# Patient Record
Sex: FEMALE | Race: ASIAN | NOT HISPANIC OR LATINO
[De-identification: names, ages, dates, MRNs, and addresses within clinical notes are randomized per-mention and may not be internally consistent; named-entity substitution may affect disease eponyms.]

---

## 2018-08-17 PROBLEM — Z00.00 ENCOUNTER FOR PREVENTIVE HEALTH EXAMINATION: Status: ACTIVE | Noted: 2018-08-17

## 2021-04-22 ENCOUNTER — APPOINTMENT (OUTPATIENT)
Dept: OTOLARYNGOLOGY | Facility: CLINIC | Age: 31
End: 2021-04-22

## 2022-05-12 ENCOUNTER — FORM ENCOUNTER (OUTPATIENT)
Age: 32
End: 2022-05-12

## 2022-05-12 ENCOUNTER — APPOINTMENT (OUTPATIENT)
Dept: ORTHOPEDIC SURGERY | Facility: CLINIC | Age: 32
End: 2022-05-12
Payer: COMMERCIAL

## 2022-05-12 VITALS — BODY MASS INDEX: 22.33 KG/M2 | WEIGHT: 134 LBS | HEIGHT: 65 IN

## 2022-05-12 PROCEDURE — 72110 X-RAY EXAM L-2 SPINE 4/>VWS: CPT

## 2022-05-12 PROCEDURE — 99203 OFFICE O/P NEW LOW 30 MIN: CPT

## 2022-05-12 PROCEDURE — 99204 OFFICE O/P NEW MOD 45 MIN: CPT

## 2022-05-12 NOTE — ASSESSMENT
[FreeTextEntry1] : Has been through formal PT guided by PCP, now with return of symptoms. \par Initiate enid\par Gabapentin- Patient advised of sedating effects, instructed not to drive, operate machinery, or take with other sedating medications. Advised of need to taper on/off medication and risk of abruptly stopping gabapentin.\par Indicated for MRI to eval for HNP\par C/w PT

## 2022-05-12 NOTE — IMAGING
[de-identified] : Inspection: No rash or ecchymosis\par Palpation: No tenderness to palpation or spasm in bilateral thoracic and lumbar paraspinal musculature, RIGHT SI joint tenderness to palpation\par ROM: Full with stiffness\par Strength: 5/5 bilateral hip flexors, knee extensors, ankle dorsiflexors, EHL, ankle plantarflexors\par Sensation: Sensation present to light touch bilateral L2-S1 distributions\par Provocative maneuvers: Negative left straight leg raise, + R SLR  [No bony abnormalities] : No bony abnormalities [AP] : anteroposterior [There are no fractures, subluxations or dislocations. No significant abnormalities are seen] : There are no fractures, subluxations or dislocations. No significant abnormalities are seen

## 2022-05-13 ENCOUNTER — APPOINTMENT (OUTPATIENT)
Dept: MRI IMAGING | Facility: CLINIC | Age: 32
End: 2022-05-13
Payer: COMMERCIAL

## 2022-05-13 PROCEDURE — 72148 MRI LUMBAR SPINE W/O DYE: CPT

## 2022-06-02 ENCOUNTER — APPOINTMENT (OUTPATIENT)
Dept: ORTHOPEDIC SURGERY | Facility: CLINIC | Age: 32
End: 2022-06-02
Payer: COMMERCIAL

## 2022-06-02 VITALS — BODY MASS INDEX: 22.33 KG/M2 | HEIGHT: 65 IN | WEIGHT: 134 LBS

## 2022-06-02 PROCEDURE — 99214 OFFICE O/P EST MOD 30 MIN: CPT

## 2022-06-02 NOTE — IMAGING
[No bony abnormalities] : No bony abnormalities [AP] : anteroposterior [There are no fractures, subluxations or dislocations. No significant abnormalities are seen] : There are no fractures, subluxations or dislocations. No significant abnormalities are seen [de-identified] : LSPINE\par Palpation: No tenderness to palpation or spasm in bilateral thoracic and lumbar paraspinal musculature, RIGHT SI joint tenderness to palpation\par ROM: Full with stiffness\par Strength: 5/5 bilateral hip flexors, knee extensors, ankle dorsiflexors, EHL, ankle plantarflexors\par Sensation: Sensation present to light touch bilateral L2-S1 distributions\par Provocative maneuvers: Negative left straight leg raise, + R SLR

## 2022-06-02 NOTE — HISTORY OF PRESENT ILLNESS
[Lower back] : lower back [Work] : work [Sleep] : sleep [Sitting] : sitting [0] : 0 [de-identified] : L spine MRI 5/13/22\par Sagittal alignment: Normal.\par Coronal alignment: Normal.\par L1-2, L2-3 and L3-4: Normal.\par L4-5: Mild degenerative disc disease with a small bulge but no stenosis or nerve root compression.\par L5-S1: Mild degenerative disc disease with a central and right-sided disc herniation with mild compression of \par the right S1 nerve root.\par Ind. review- L5/S1 annular injury with R paracentral and lateral recess narrowing\par __________________\par \par 05/12/2022 : a 33 yo female, presents for right lower back pain noticed on 05/02/2022 after waking up. a constant pain that radiate to the right gluteal area with stiffness and swelling of the right lower back, tingling in to the right leg. she described the pain as tightness/soreness. no specific injury/pain medication/x-ray. Reports a 'nerve test' in the legs Sept 2021 showed pinched nerve performed at PT. Was in formal PT guided by PCP at that time and symptoms resolved, has been doing HEP since. \par PMH- denies PSH- septoplasty, appy \par 6/2/22- MRI follow-up. Sxs somewhat improved, still radiating to the R glutes intermittently to the leg\par \par  [] : no [FreeTextEntry5] : 06/02/2022: a 31 yo female, presents for lower back pain follow up. still experiencing occasional tingling of the right foreleg. no pain/stiffness/numbness. +PT [de-identified] : 05/31/2022 [de-identified] : MRI

## 2022-06-02 NOTE — ASSESSMENT
[FreeTextEntry1] : L5/S1 annular injury with R paracentral and lateral recess narrowing\par \par Gabapentin- Patient advised of sedating effects, instructed not to drive, operate machinery, or take with other sedating medications. Advised of need to taper on/off medication and risk of abruptly stopping gabapentin.\par \par Discussed Pain\par C/w PT

## 2022-08-04 ENCOUNTER — APPOINTMENT (OUTPATIENT)
Dept: ORTHOPEDIC SURGERY | Facility: CLINIC | Age: 32
End: 2022-08-04

## 2022-10-21 ENCOUNTER — APPOINTMENT (OUTPATIENT)
Dept: FAMILY MEDICINE | Facility: CLINIC | Age: 32
End: 2022-10-21

## 2022-10-21 VITALS
OXYGEN SATURATION: 99 % | HEIGHT: 65 IN | HEART RATE: 89 BPM | DIASTOLIC BLOOD PRESSURE: 76 MMHG | WEIGHT: 138 LBS | TEMPERATURE: 97.7 F | SYSTOLIC BLOOD PRESSURE: 113 MMHG | BODY MASS INDEX: 22.99 KG/M2

## 2022-10-21 DIAGNOSIS — Z87.09 PERSONAL HISTORY OF OTHER DISEASES OF THE RESPIRATORY SYSTEM: ICD-10-CM

## 2022-10-21 DIAGNOSIS — Z83.3 FAMILY HISTORY OF DIABETES MELLITUS: ICD-10-CM

## 2022-10-21 DIAGNOSIS — Z87.891 PERSONAL HISTORY OF NICOTINE DEPENDENCE: ICD-10-CM

## 2022-10-21 DIAGNOSIS — T78.02XA ANAPHYLACTIC REACTION DUE TO SHELLFISH (CRUSTACEANS), INITIAL ENCOUNTER: ICD-10-CM

## 2022-10-21 DIAGNOSIS — Z20.2 CONTACT WITH AND (SUSPECTED) EXPOSURE TO INFECTIONS WITH A PREDOMINANTLY SEXUAL MODE OF TRANSMISSION: ICD-10-CM

## 2022-10-21 DIAGNOSIS — G93.32 MYALGIC ENCEPHALOMYELITIS/CHRONIC FATIGUE SYNDROME: ICD-10-CM

## 2022-10-21 DIAGNOSIS — Z82.49 FAMILY HISTORY OF ISCHEMIC HEART DISEASE AND OTHER DISEASES OF THE CIRCULATORY SYSTEM: ICD-10-CM

## 2022-10-21 DIAGNOSIS — Z86.69 PERSONAL HISTORY OF OTHER DISEASES OF THE NERVOUS SYSTEM AND SENSE ORGANS: ICD-10-CM

## 2022-10-21 DIAGNOSIS — Z87.19 PERSONAL HISTORY OF OTHER DISEASES OF THE DIGESTIVE SYSTEM: ICD-10-CM

## 2022-10-21 PROCEDURE — 36415 COLL VENOUS BLD VENIPUNCTURE: CPT

## 2022-10-21 PROCEDURE — 99385 PREV VISIT NEW AGE 18-39: CPT | Mod: 25

## 2022-10-21 NOTE — HISTORY OF PRESENT ILLNESS
[de-identified] : Pt came to office for annual physical exam today.\par Pt complained feeling very tired lately. Pt has sleep apnea and used CPAP machine every night and cleaned the machine regularly.

## 2022-10-22 ENCOUNTER — TRANSCRIPTION ENCOUNTER (OUTPATIENT)
Age: 32
End: 2022-10-22

## 2022-10-22 ENCOUNTER — APPOINTMENT (OUTPATIENT)
Dept: FAMILY MEDICINE | Facility: CLINIC | Age: 32
End: 2022-10-22

## 2022-10-22 PROCEDURE — 36415 COLL VENOUS BLD VENIPUNCTURE: CPT

## 2022-10-23 LAB
ALBUMIN SERPL ELPH-MCNC: 3.9 G/DL
ALP BLD-CCNC: 68 U/L
ALT SERPL-CCNC: 14 U/L
ANION GAP SERPL CALC-SCNC: 13 MMOL/L
APPEARANCE: CLEAR
AST SERPL-CCNC: 14 U/L
BASOPHILS # BLD AUTO: 0.05 K/UL
BASOPHILS NFR BLD AUTO: 0.4 %
BILIRUB SERPL-MCNC: 0.2 MG/DL
BILIRUBIN URINE: NEGATIVE
BLOOD URINE: NEGATIVE
BUN SERPL-MCNC: 9 MG/DL
CALCIUM SERPL-MCNC: 9.1 MG/DL
CHLORIDE SERPL-SCNC: 104 MMOL/L
CHOLEST SERPL-MCNC: 135 MG/DL
CO2 SERPL-SCNC: 22 MMOL/L
COLOR: NORMAL
CREAT SERPL-MCNC: 0.74 MG/DL
EGFR: 109 ML/MIN/1.73M2
EOSINOPHIL # BLD AUTO: 0.37 K/UL
EOSINOPHIL NFR BLD AUTO: 3.3 %
ESTIMATED AVERAGE GLUCOSE: 108 MG/DL
GLUCOSE QUALITATIVE U: NEGATIVE
GLUCOSE SERPL-MCNC: 84 MG/DL
HBA1C MFR BLD HPLC: 5.4 %
HBV CORE IGG+IGM SER QL: NONREACTIVE
HBV SURFACE AB SER QL: REACTIVE
HBV SURFACE AG SER QL: NONREACTIVE
HCT VFR BLD CALC: 43.8 %
HCV AB SER QL: NONREACTIVE
HCV S/CO RATIO: 0.14 S/CO
HDLC SERPL-MCNC: 56 MG/DL
HGB BLD-MCNC: 14.1 G/DL
HIV1+2 AB SPEC QL IA.RAPID: NONREACTIVE
HSV 1+2 IGG SER IA-IMP: NEGATIVE
HSV 1+2 IGG SER IA-IMP: POSITIVE
HSV1 IGG SER QL: >62.2 INDEX
HSV2 IGG SER QL: 0.6 INDEX
IMM GRANULOCYTES NFR BLD AUTO: 0.2 %
KETONES URINE: NEGATIVE
LDLC SERPL CALC-MCNC: 60 MG/DL
LEUKOCYTE ESTERASE URINE: NEGATIVE
LYMPHOCYTES # BLD AUTO: 3.61 K/UL
LYMPHOCYTES NFR BLD AUTO: 32.4 %
MAN DIFF?: NORMAL
MCHC RBC-ENTMCNC: 28.4 PG
MCHC RBC-ENTMCNC: 32.2 GM/DL
MCV RBC AUTO: 88.1 FL
MONOCYTES # BLD AUTO: 0.4 K/UL
MONOCYTES NFR BLD AUTO: 3.6 %
NEUTROPHILS # BLD AUTO: 6.69 K/UL
NEUTROPHILS NFR BLD AUTO: 60.1 %
NITRITE URINE: NEGATIVE
NONHDLC SERPL-MCNC: 79 MG/DL
PH URINE: 6
PLATELET # BLD AUTO: 327 K/UL
POTASSIUM SERPL-SCNC: 4.2 MMOL/L
PROT SERPL-MCNC: 7.4 G/DL
PROTEIN URINE: NEGATIVE
RBC # BLD: 4.97 M/UL
RBC # FLD: 13.3 %
SODIUM SERPL-SCNC: 138 MMOL/L
SPECIFIC GRAVITY URINE: 1.01
T PALLIDUM AB SER QL IA: NEGATIVE
TRIGL SERPL-MCNC: 93 MG/DL
TSH SERPL-ACNC: 1.44 UIU/ML
URATE SERPL-MCNC: 4.9 MG/DL
UROBILINOGEN URINE: NORMAL
WBC # FLD AUTO: 11.14 K/UL

## 2022-10-24 LAB
C TRACH RRNA SPEC QL NAA+PROBE: NOT DETECTED
N GONORRHOEA RRNA SPEC QL NAA+PROBE: NOT DETECTED
SOURCE AMPLIFICATION: NORMAL

## 2022-10-27 LAB
HSV1 IGM SER QL: NEGATIVE
HSV2 AB FLD-ACNC: NEGATIVE

## 2022-10-29 PROBLEM — B00.9 HERPES SIMPLEX TYPE 1 INFECTION: Status: ACTIVE | Noted: 2022-10-29

## 2022-11-01 ENCOUNTER — APPOINTMENT (OUTPATIENT)
Dept: FAMILY MEDICINE | Facility: CLINIC | Age: 32
End: 2022-11-01

## 2022-11-01 DIAGNOSIS — B00.9 HERPESVIRAL INFECTION, UNSPECIFIED: ICD-10-CM

## 2022-11-01 DIAGNOSIS — T78.02XA ANAPHYLACTIC REACTION DUE TO SHELLFISH (CRUSTACEANS), INITIAL ENCOUNTER: ICD-10-CM

## 2022-11-01 PROCEDURE — 99213 OFFICE O/P EST LOW 20 MIN: CPT

## 2022-11-01 RX ORDER — EPINEPHRINE 0.3 MG/.3ML
0.3 INJECTION INTRAMUSCULAR
Qty: 2 | Refills: 3 | Status: ACTIVE | COMMUNITY
Start: 2022-11-01 | End: 1900-01-01

## 2022-11-01 NOTE — HISTORY OF PRESENT ILLNESS
[de-identified] : WBC was 11.14, lymphocyte 3.61, and herpes simplex I positive. Reports were reviewed with pt in details. Other blood tests came back wnl. \par Pt denied fever, cough and diarrhea. Pt had h/o elevated white blood cells since teenage, s/p workup from hematologist, including spine cord aspiration, unremarkable.\par \par Pt requested Epipen because pt will travel and she is allergic to shellfish.

## 2023-03-17 ENCOUNTER — APPOINTMENT (OUTPATIENT)
Dept: OTOLARYNGOLOGY | Facility: CLINIC | Age: 33
End: 2023-03-17
Payer: COMMERCIAL

## 2023-03-17 VITALS
HEIGHT: 65 IN | HEART RATE: 101 BPM | BODY MASS INDEX: 22.99 KG/M2 | WEIGHT: 138 LBS | DIASTOLIC BLOOD PRESSURE: 74 MMHG | SYSTOLIC BLOOD PRESSURE: 114 MMHG

## 2023-03-17 PROCEDURE — 31575 DIAGNOSTIC LARYNGOSCOPY: CPT

## 2023-03-17 PROCEDURE — 99204 OFFICE O/P NEW MOD 45 MIN: CPT | Mod: 25

## 2023-03-17 RX ORDER — GABAPENTIN 100 MG/1
100 CAPSULE ORAL
Qty: 60 | Refills: 2 | Status: COMPLETED | COMMUNITY
Start: 2022-05-12 | End: 2023-03-17

## 2023-03-17 RX ORDER — METHYLPREDNISOLONE 4 MG/1
4 TABLET ORAL
Qty: 1 | Refills: 0 | Status: COMPLETED | COMMUNITY
Start: 2022-05-12 | End: 2023-03-17

## 2023-03-17 RX ORDER — DROSPIRENONE AND ETHINYL ESTRADIOL 0.02-3(28)
KIT ORAL
Refills: 0 | Status: ACTIVE | COMMUNITY

## 2023-03-17 NOTE — PHYSICAL EXAM
[Midline] : trachea located in midline position [Normal] : no rashes [FreeTextEntry1] : moderate sleep apnea, daytime hypersomnolence  [de-identified] : anterior part over resected, posterior part obstructing

## 2023-03-17 NOTE — PROCEDURE
[Unable to Cooperate with Mirror] : patient unable to cooperate with mirror [Complicated Symptoms] : complicated symptoms requiring more thorough examination than provided by mirror [Topical Lidocaine] : topical lidocaine [Oxymetazoline HCl] : oxymetazoline HCl [Flexible Endoscope] : examined with the flexible endoscope [Serial Number: ___] : Serial Number: [unfilled] [True Vocal Cords Paralysis] : no true vocal cord paralysis [True Vocal Cords Erythematous] : no true vocal cord edema [True Vocal Cords Allred's Nodules] : no true vocal cord nodules [Glottis Arytenoid Cartilages] : no arytenoid granulomas [Glottis Arytenoid Cartilages Erythema] : no arytenoid erythema [Normal] : posterior cricoid area had healthy pink mucosa in the interarytenoid area and the esophageal inlet [Arytenoid Edema ___ /4] : bilaterally were normal [Arytenoid Erythema ___ /4] : bilaterally were normal [de-identified] : Patient was placed in the examination chair in a sitting position. The nose was decongested with oxymetazoline nasal solution. The airway was anesthetized with 4% Xylocaine.  The back of the throat was anesthetized with Cetacaine. Direct flexible/rigid video endoscopy was performed. Findings revealed: \par Pt has midline septum, a septal perforation was not observed, anterior part of turbinate over resected, posterior part of turbinate obstructing, negative Sim maneuver on inspiration, thick base of tongue obliterating the vallecula, larynx epiglottis and vocal cords normal. [de-identified] : thick [de-identified] : obliterated

## 2023-03-17 NOTE — HISTORY OF PRESENT ILLNESS
[Obstructive Sleep Apnea] : Obstructive Sleep Apnea [Snoring] : snoring [Frequent Nocturnal Awakening] : frequent nocturnal awakening [Daytime Somnolence] : daytime somnolence [Unintentional Sleep While Inactive] : unintentional sleep while inactive [Awakes Unrefreshed] : awakening unrefreshed [Awakes with Headache] : headache upon awakening [Awakening With Dry Mouth] : awakening with dry mouth [AHI: ___ per hour] : the apnea-hypopnea index was [unfilled] per hour [de-identified] : 32 year old female presents for sleep apnea. History of deviated septum, turbinectomy and nasal fracture repair 2021 with Dr. Warner Rick ([place]). States had CT done 3/2022 possibly at [facility], will fax over results. Reports snoring at night with pausing gasping choking. Home sleep study 1/31/22 DONALD 25.8.Patient has a BMI of 22.96. \par Patient has tried a CPAP machine. \par \par CT sinus 2/15/2020 at [facility] Impression: Scattered inflammatory ethmoid and maxillary sinus changes. Hypertrophy of the nasal turbinates with findings suggesting chronic rhinitis and/or nasal polyposis, bilateral lizz bullosa/lamella, left lizz suprema and nasal septal deviation result in narrowing/obstruction of air passages. 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 There is periodontal disease surrounding the roots of the right central and left lateral incisor teeth, unchanged from the prior study.Narrowing of the temporomandibular joints. [Witnessed Apneas] : no witnessed sleep apnea [Unintentional Sleep while Active] : no unintentional sleep while active [Recent  Weight Gain] : no recent weight gain

## 2023-08-19 ENCOUNTER — APPOINTMENT (OUTPATIENT)
Dept: FAMILY MEDICINE | Facility: CLINIC | Age: 33
End: 2023-08-19

## 2023-08-26 ENCOUNTER — APPOINTMENT (OUTPATIENT)
Dept: FAMILY MEDICINE | Facility: CLINIC | Age: 33
End: 2023-08-26

## 2023-10-11 ENCOUNTER — APPOINTMENT (OUTPATIENT)
Dept: FAMILY MEDICINE | Facility: CLINIC | Age: 33
End: 2023-10-11
Payer: COMMERCIAL

## 2023-10-11 VITALS — SYSTOLIC BLOOD PRESSURE: 124 MMHG | DIASTOLIC BLOOD PRESSURE: 88 MMHG

## 2023-10-11 VITALS
HEIGHT: 65 IN | WEIGHT: 148 LBS | BODY MASS INDEX: 24.66 KG/M2 | DIASTOLIC BLOOD PRESSURE: 90 MMHG | OXYGEN SATURATION: 97 % | HEART RATE: 96 BPM | SYSTOLIC BLOOD PRESSURE: 134 MMHG | TEMPERATURE: 98.7 F

## 2023-10-11 DIAGNOSIS — R09.81 NASAL CONGESTION: ICD-10-CM

## 2023-10-11 DIAGNOSIS — R42 DIZZINESS AND GIDDINESS: ICD-10-CM

## 2023-10-11 PROCEDURE — 99213 OFFICE O/P EST LOW 20 MIN: CPT | Mod: 25

## 2023-10-11 PROCEDURE — 36415 COLL VENOUS BLD VENIPUNCTURE: CPT

## 2023-10-11 RX ORDER — MECLIZINE HYDROCHLORIDE 12.5 MG/1
12.5 TABLET ORAL DAILY
Qty: 20 | Refills: 0 | Status: ACTIVE | COMMUNITY
Start: 2023-10-11 | End: 1900-01-01

## 2023-10-11 RX ORDER — FLUTICASONE PROPIONATE 50 UG/1
50 SPRAY, METERED NASAL TWICE DAILY
Qty: 1 | Refills: 3 | Status: ACTIVE | COMMUNITY
Start: 2023-10-11 | End: 1900-01-01

## 2023-10-14 LAB
BASOPHILS # BLD AUTO: 0.08 K/UL
BASOPHILS NFR BLD AUTO: 0.6 %
EOSINOPHIL # BLD AUTO: 0.31 K/UL
EOSINOPHIL NFR BLD AUTO: 2.3 %
HCT VFR BLD CALC: 43.3 %
HGB BLD-MCNC: 14.2 G/DL
IMM GRANULOCYTES NFR BLD AUTO: 0.3 %
LYMPHOCYTES # BLD AUTO: 4.07 K/UL
LYMPHOCYTES NFR BLD AUTO: 30.1 %
MAN DIFF?: NORMAL
MCHC RBC-ENTMCNC: 28.6 PG
MCHC RBC-ENTMCNC: 32.8 GM/DL
MCV RBC AUTO: 87.3 FL
MONOCYTES # BLD AUTO: 0.5 K/UL
MONOCYTES NFR BLD AUTO: 3.7 %
NEUTROPHILS # BLD AUTO: 8.5 K/UL
NEUTROPHILS NFR BLD AUTO: 63 %
PLATELET # BLD AUTO: 352 K/UL
RBC # BLD: 4.96 M/UL
RBC # FLD: 13.5 %
WBC # FLD AUTO: 13.5 K/UL

## 2023-10-25 ENCOUNTER — APPOINTMENT (OUTPATIENT)
Dept: FAMILY MEDICINE | Facility: CLINIC | Age: 33
End: 2023-10-25
Payer: COMMERCIAL

## 2023-10-25 PROCEDURE — 99441: CPT

## 2023-12-01 PROBLEM — Z00.01 ENCOUNTER FOR WELL ADULT EXAM WITH ABNORMAL FINDINGS: Status: ACTIVE | Noted: 2022-10-18

## 2023-12-01 PROBLEM — D72.829 ELEVATED WHITE BLOOD CELL COUNT: Status: ACTIVE | Noted: 2022-10-29

## 2023-12-01 PROBLEM — G47.30 SLEEP APNEA: Status: ACTIVE | Noted: 2022-10-21

## 2023-12-06 ENCOUNTER — APPOINTMENT (OUTPATIENT)
Dept: FAMILY MEDICINE | Facility: CLINIC | Age: 33
End: 2023-12-06

## 2023-12-06 DIAGNOSIS — Z00.01 ENCOUNTER FOR GENERAL ADULT MEDICAL EXAMINATION WITH ABNORMAL FINDINGS: ICD-10-CM

## 2023-12-06 DIAGNOSIS — G47.30 SLEEP APNEA, UNSPECIFIED: ICD-10-CM

## 2023-12-06 DIAGNOSIS — D72.829 ELEVATED WHITE BLOOD CELL COUNT, UNSPECIFIED: ICD-10-CM

## 2023-12-31 PROBLEM — Z20.2 EXPOSURE TO SEXUALLY TRANSMITTED DISEASE (STD): Status: ACTIVE | Noted: 2022-10-21

## 2024-04-16 ENCOUNTER — APPOINTMENT (OUTPATIENT)
Dept: ORTHOPEDIC SURGERY | Facility: CLINIC | Age: 34
End: 2024-04-16
Payer: COMMERCIAL

## 2024-04-16 VITALS — BODY MASS INDEX: 24.99 KG/M2 | WEIGHT: 150 LBS | HEIGHT: 65 IN

## 2024-04-16 DIAGNOSIS — S33.5XXA SPRAIN OF LIGAMENTS OF LUMBAR SPINE, INITIAL ENCOUNTER: ICD-10-CM

## 2024-04-16 PROCEDURE — 72050 X-RAY EXAM NECK SPINE 4/5VWS: CPT

## 2024-04-16 PROCEDURE — 72110 X-RAY EXAM L-2 SPINE 4/>VWS: CPT

## 2024-04-16 PROCEDURE — 72170 X-RAY EXAM OF PELVIS: CPT

## 2024-04-16 PROCEDURE — 99214 OFFICE O/P EST MOD 30 MIN: CPT | Mod: 25

## 2024-04-16 RX ORDER — GABAPENTIN 100 MG/1
100 CAPSULE ORAL
Qty: 60 | Refills: 2 | Status: ACTIVE | COMMUNITY
Start: 2024-04-16 | End: 1900-01-01

## 2024-04-16 NOTE — HISTORY OF PRESENT ILLNESS
[Neck] : neck [Lower back] : lower back [8] : 8 [7] : 7 [Radiating] : radiating [Sharp] : sharp [Tightness] : tightness [Constant] : constant [Work] : work [Sleep] : sleep [Nothing helps with pain getting better] : Nothing helps with pain getting better [Sitting] : sitting [de-identified] : L spine MRI 5/13/22 Sagittal alignment: Normal. Coronal alignment: Normal. L1-2, L2-3 and L3-4: Normal. L4-5: Mild degenerative disc disease with a small bulge but no stenosis or nerve root compression. L5-S1: Mild degenerative disc disease with a central and right-sided disc herniation with mild compression of  the right S1 nerve root. Ind. review- L5/S1 annular injury with R paracentral and lateral recess narrowing __________________ 05/12/2022 : a 31 yo female, presents for right lower back pain noticed on 05/02/2022 after waking up. a constant pain that radiate to the right gluteal area with stiffness and swelling of the right lower back, tingling in to the right leg. she described the pain as tightness/soreness. no specific injury/pain medication/x-ray. Reports a 'nerve test' in the legs Sept 2021 showed pinched nerve performed at PT. Was in formal PT guided by PCP at that time and symptoms resolved, has been doing HEP since.  PMH- denies PSH- septoplasty, appy  6/2/22- MRI follow-up. Sxs somewhat improved, still radiating to the R glutes intermittently to the leg  04/16/2024: Pt is here today to follow up on lower back pain and mid-upper back pain. She states that her lower back pain has returned for the past 2 weeks, and it is beginning to radiate down into her right hip/glutes. No numbness/tingling, she also reports that about 6 months ago she started to feel tight sharp pain in her upper mid back with certain movements, pt has been experiencing numbness and tingling in her right arm and tightness in her neck. She has restricted ROM in both her neck and right shoulder, she has tried massage and stretching for relief, but it doesn't help. RHDF. Pan and tingling down the RUE to the forearm.    [] : no [de-identified] : certain movements  [de-identified] : 05/31/2022

## 2024-04-16 NOTE — HISTORY OF PRESENT ILLNESS
[Neck] : neck [Lower back] : lower back [8] : 8 [7] : 7 [Radiating] : radiating [Sharp] : sharp [Tightness] : tightness [Constant] : constant [Work] : work [Sleep] : sleep [Nothing helps with pain getting better] : Nothing helps with pain getting better [Sitting] : sitting [de-identified] : L spine MRI 5/13/22 Sagittal alignment: Normal. Coronal alignment: Normal. L1-2, L2-3 and L3-4: Normal. L4-5: Mild degenerative disc disease with a small bulge but no stenosis or nerve root compression. L5-S1: Mild degenerative disc disease with a central and right-sided disc herniation with mild compression of  the right S1 nerve root. Ind. review- L5/S1 annular injury with R paracentral and lateral recess narrowing __________________ 05/12/2022 : a 33 yo female, presents for right lower back pain noticed on 05/02/2022 after waking up. a constant pain that radiate to the right gluteal area with stiffness and swelling of the right lower back, tingling in to the right leg. she described the pain as tightness/soreness. no specific injury/pain medication/x-ray. Reports a 'nerve test' in the legs Sept 2021 showed pinched nerve performed at PT. Was in formal PT guided by PCP at that time and symptoms resolved, has been doing HEP since.  PMH- denies PSH- septoplasty, appy  6/2/22- MRI follow-up. Sxs somewhat improved, still radiating to the R glutes intermittently to the leg  04/16/2024: Pt is here today to follow up on lower back pain and mid-upper back pain. She states that her lower back pain has returned for the past 2 weeks, and it is beginning to radiate down into her right hip/glutes. No numbness/tingling, she also reports that about 6 months ago she started to feel tight sharp pain in her upper mid back with certain movements, pt has been experiencing numbness and tingling in her right arm and tightness in her neck. She has restricted ROM in both her neck and right shoulder, she has tried massage and stretching for relief, but it doesn't help. RHDF. Pan and tingling down the RUE to the forearm.    [] : no [de-identified] : certain movements  [de-identified] : 05/31/2022

## 2024-04-16 NOTE — IMAGING
[de-identified] : CSPINE Inspection: No rash or ecchymosis Palpation: spasm and TTP in traps, rhomboids, paracervicals ROM: Limited all planes Strength: 5/5 LEFT deltoid, biceps, triceps, wrist flexors, wrist extensors, , abductors. R WF 4/5, otherwise 5/5 Sensation: Sensation present to light touch bilateral C5-T1 distributions Reflexes: Negative Ivey's bilaterally  Bilateral Shoulders- Palpation: No tenderness to palpation ROM: Full with no pain Strength: Decent strength with rotator cuff testing Provocative maneuvers: Negative impingement testing  LSPINE Palpation: No tenderness to palpation or spasm in bilateral thoracic and lumbar paraspinal musculature, RIGHT SI joint tenderness to palpation ROM: Full with stiffness Strength: 5/5 bilateral hip flexors, knee extensors, ankle dorsiflexors, EHL, ankle plantarflexors Sensation: Sensation present to light touch bilateral L2-S1 distributions Provocative maneuvers: Negative left straight leg raise, equivocal R SLR  [Straightening consistent with spasm] : Straightening consistent with spasm [Disc space narrowing] : Disc space narrowing [AP] : anteroposterior [There are no fractures, subluxations or dislocations. No significant abnormalities are seen] : There are no fractures, subluxations or dislocations. No significant abnormalities are seen

## 2024-04-16 NOTE — IMAGING
[de-identified] : CSPINE Inspection: No rash or ecchymosis Palpation: spasm and TTP in traps, rhomboids, paracervicals ROM: Limited all planes Strength: 5/5 LEFT deltoid, biceps, triceps, wrist flexors, wrist extensors, , abductors. R WF 4/5, otherwise 5/5 Sensation: Sensation present to light touch bilateral C5-T1 distributions Reflexes: Negative Ivey's bilaterally  Bilateral Shoulders- Palpation: No tenderness to palpation ROM: Full with no pain Strength: Decent strength with rotator cuff testing Provocative maneuvers: Negative impingement testing  LSPINE Palpation: No tenderness to palpation or spasm in bilateral thoracic and lumbar paraspinal musculature, RIGHT SI joint tenderness to palpation ROM: Full with stiffness Strength: 5/5 bilateral hip flexors, knee extensors, ankle dorsiflexors, EHL, ankle plantarflexors Sensation: Sensation present to light touch bilateral L2-S1 distributions Provocative maneuvers: Negative left straight leg raise, equivocal R SLR  [Straightening consistent with spasm] : Straightening consistent with spasm [Disc space narrowing] : Disc space narrowing [AP] : anteroposterior [There are no fractures, subluxations or dislocations. No significant abnormalities are seen] : There are no fractures, subluxations or dislocations. No significant abnormalities are seen

## 2024-04-16 NOTE — ASSESSMENT
[FreeTextEntry1] : L5/S1 annular injury with R paracentral and lateral recess narrowing  Gabapentin- Patient advised of sedating effects, instructed not to drive, operate machinery, or take with other sedating medications. Advised of need to taper on/off medication and risk of abruptly stopping gabapentin.  C/w PT

## 2024-06-27 ENCOUNTER — APPOINTMENT (OUTPATIENT)
Dept: ORTHOPEDIC SURGERY | Facility: CLINIC | Age: 34
End: 2024-06-27

## 2024-06-27 DIAGNOSIS — M54.16 RADICULOPATHY, LUMBAR REGION: ICD-10-CM

## 2024-06-27 DIAGNOSIS — M54.12 RADICULOPATHY, CERVICAL REGION: ICD-10-CM

## 2024-06-27 DIAGNOSIS — M51.36 OTHER INTERVERTEBRAL DISC DEGENERATION, LUMBAR REGION: ICD-10-CM

## 2024-06-27 PROCEDURE — 99214 OFFICE O/P EST MOD 30 MIN: CPT

## 2024-07-19 ENCOUNTER — APPOINTMENT (OUTPATIENT)
Dept: MRI IMAGING | Facility: CLINIC | Age: 34
End: 2024-07-19

## 2024-07-19 PROCEDURE — 72141 MRI NECK SPINE W/O DYE: CPT

## 2024-08-01 ENCOUNTER — APPOINTMENT (OUTPATIENT)
Dept: ORTHOPEDIC SURGERY | Facility: CLINIC | Age: 34
End: 2024-08-01
Payer: COMMERCIAL

## 2024-08-01 DIAGNOSIS — M54.12 RADICULOPATHY, CERVICAL REGION: ICD-10-CM

## 2024-08-01 PROCEDURE — 99215 OFFICE O/P EST HI 40 MIN: CPT

## 2024-08-01 NOTE — ASSESSMENT
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  Advised to clear medication with internist or PCP if any concurrent health problem with heart, blood pressure, or GI system exists.  C/w PT

## 2024-08-01 NOTE — HISTORY OF PRESENT ILLNESS
[Neck] : neck [Lower back] : lower back [8] : 8 [7] : 7 [Radiating] : radiating [Sharp] : sharp [Tightness] : tightness [Constant] : constant [Work] : work [Sleep] : sleep [Nothing helps with pain getting better] : Nothing helps with pain getting better [Sitting] : sitting [de-identified] : 7/19/2024 Cervical MRI  - report noted in chart.   Ind. 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 Coronal alignment: Normal. L1-2, L2-3 and L3-4: Normal. L4-5: Mild degenerative disc disease with a small bulge but no stenosis or nerve root compression. L5-S1: Mild degenerative disc disease with a central and right-sided disc herniation with mild compression of  the right S1 nerve root. Ind. review- L5/S1 annular injury with R paracentral and lateral recess narrowing ___________________________________________________________________ 05/12/2022 : a 31 yo female, presents for right lower back pain noticed on 05/02/2022 after waking up. a constant pain that radiate to the right gluteal area with stiffness and swelling of the right lower back, tingling in to the right leg. she described the pain as tightness/soreness. no specific injury/pain medication/x-ray. Reports a 'nerve test' in the legs Sept 2021 showed pinched nerve performed at PT. Was in formal PT guided by PCP at that time and symptoms resolved, has been doing HEP since.   PMH- denies  PSH- septoplasty, appy  SH: no tobacco, occ. etoh Occ: desk work   6/2/22- MRI follow-up. Sxs somewhat improved, still radiating to the R glutes intermittently to the leg 04/16/2024: Pt is here today to follow up on lower back pain and mid-upper back pain. She states that her lower back pain has returned for the past 2 weeks, and it is beginning to radiate down into her right hip/glutes. No numbness/tingling, she also reports that about 6 months ago she started to feel tight sharp pain in her upper mid back with certain movements, pt has been experiencing numbness and tingling in her right arm and tightness in her neck. She has restricted ROM in both her neck and right shoulder, she has tried massage and stretching for relief, but it doesn't help. RHDF. Pan and tingling down the RUE to the forearm.   06/27/2024 patient is here to follow up on lower/ upper and mid back. pt states that the lower back improved but the neck has not improved. experiencing pain in a resting state. neck pain radiates thru the R scapula. Has been in PT.   08/01/2024 DORON 34 year F is here today to follow up on MRI results of c-spine. Patient is doing PT, reports no changes with pain since last visit. Pain still radiating through the scapulae.    [] : no [de-identified] : certain movements  [de-identified] : 05/31/2022

## 2024-08-01 NOTE — IMAGING
[Straightening consistent with spasm] : Straightening consistent with spasm [Disc space narrowing] : Disc space narrowing [AP] : anteroposterior [There are no fractures, subluxations or dislocations. No significant abnormalities are seen] : There are no fractures, subluxations or dislocations. No significant abnormalities are seen [de-identified] : CSPINE Inspection: No rash or ecchymosis Palpation: spasm and TTP in traps, rhomboids, paracervicals ROM: Limited all planes Strength: 5/5 LEFT deltoid, biceps, triceps, wrist flexors, wrist extensors, , abductors. R WF 4/5, otherwise 5/5 Sensation: Sensation present to light touch bilateral C5-T1 distributions Reflexes: Negative Ivey's bilaterally  Bilateral Shoulders- Palpation: No tenderness to palpation ROM: Full with no pain Strength: Decent strength with rotator cuff testing Provocative maneuvers: Negative impingement testing  LSPINE Palpation: No tenderness to palpation or spasm in bilateral thoracic and lumbar paraspinal musculature, RIGHT SI joint tenderness to palpation ROM: Full with stiffness Strength: 5/5 bilateral hip flexors, knee extensors, ankle dorsiflexors, EHL, ankle plantarflexors Sensation: Sensation present to light touch bilateral L2-S1 distributions Provocative maneuvers: Negative left straight leg raise, equivocal R SLR

## 2024-09-12 ENCOUNTER — APPOINTMENT (OUTPATIENT)
Dept: ORTHOPEDIC SURGERY | Facility: CLINIC | Age: 34
End: 2024-09-12

## 2024-11-21 ENCOUNTER — APPOINTMENT (OUTPATIENT)
Dept: ORTHOPEDIC SURGERY | Facility: CLINIC | Age: 34
End: 2024-11-21
Payer: COMMERCIAL

## 2024-11-21 DIAGNOSIS — M54.12 RADICULOPATHY, CERVICAL REGION: ICD-10-CM

## 2024-11-21 PROCEDURE — 99215 OFFICE O/P EST HI 40 MIN: CPT

## 2025-01-15 ENCOUNTER — APPOINTMENT (OUTPATIENT)
Dept: FAMILY MEDICINE | Facility: CLINIC | Age: 35
End: 2025-01-15
Payer: COMMERCIAL

## 2025-01-15 ENCOUNTER — NON-APPOINTMENT (OUTPATIENT)
Age: 35
End: 2025-01-15

## 2025-01-15 VITALS
HEART RATE: 90 BPM | WEIGHT: 134 LBS | HEIGHT: 65 IN | SYSTOLIC BLOOD PRESSURE: 114 MMHG | DIASTOLIC BLOOD PRESSURE: 80 MMHG | TEMPERATURE: 97.4 F | BODY MASS INDEX: 22.33 KG/M2 | OXYGEN SATURATION: 88 % | RESPIRATION RATE: 16 BRPM

## 2025-01-15 DIAGNOSIS — M54.16 RADICULOPATHY, LUMBAR REGION: ICD-10-CM

## 2025-01-15 DIAGNOSIS — G47.30 SLEEP APNEA, UNSPECIFIED: ICD-10-CM

## 2025-01-15 DIAGNOSIS — Z00.01 ENCOUNTER FOR GENERAL ADULT MEDICAL EXAMINATION WITH ABNORMAL FINDINGS: ICD-10-CM

## 2025-01-15 DIAGNOSIS — M54.12 RADICULOPATHY, CERVICAL REGION: ICD-10-CM

## 2025-01-15 PROCEDURE — 36415 COLL VENOUS BLD VENIPUNCTURE: CPT

## 2025-01-15 PROCEDURE — 99395 PREV VISIT EST AGE 18-39: CPT

## 2025-01-16 LAB
25(OH)D3 SERPL-MCNC: 35.7 NG/ML
ALBUMIN SERPL ELPH-MCNC: 4.1 G/DL
ALP BLD-CCNC: 61 U/L
ALT SERPL-CCNC: 18 U/L
ANION GAP SERPL CALC-SCNC: 13 MMOL/L
APPEARANCE: CLEAR
AST SERPL-CCNC: 15 U/L
BASOPHILS # BLD AUTO: 0.03 K/UL
BASOPHILS NFR BLD AUTO: 0.3 %
BILIRUB SERPL-MCNC: 0.2 MG/DL
BILIRUBIN URINE: NEGATIVE
BLOOD URINE: NEGATIVE
BUN SERPL-MCNC: 12 MG/DL
CALCIUM SERPL-MCNC: 9.1 MG/DL
CHLORIDE SERPL-SCNC: 100 MMOL/L
CHOLEST SERPL-MCNC: 142 MG/DL
CO2 SERPL-SCNC: 22 MMOL/L
COLOR: YELLOW
CREAT SERPL-MCNC: 0.87 MG/DL
EGFR: 88 ML/MIN/1.73M2
EOSINOPHIL # BLD AUTO: 0.17 K/UL
EOSINOPHIL NFR BLD AUTO: 1.6 %
ESTIMATED AVERAGE GLUCOSE: 111 MG/DL
GLUCOSE QUALITATIVE U: NEGATIVE MG/DL
GLUCOSE SERPL-MCNC: 94 MG/DL
HBA1C MFR BLD HPLC: 5.5 %
HCT VFR BLD CALC: 39.5 %
HDLC SERPL-MCNC: 46 MG/DL
HGB BLD-MCNC: 13.2 G/DL
IMM GRANULOCYTES NFR BLD AUTO: 0.3 %
KETONES URINE: NEGATIVE MG/DL
LDLC SERPL CALC-MCNC: 77 MG/DL
LEUKOCYTE ESTERASE URINE: NEGATIVE
LYMPHOCYTES # BLD AUTO: 3.34 K/UL
LYMPHOCYTES NFR BLD AUTO: 32.4 %
MAN DIFF?: NORMAL
MCHC RBC-ENTMCNC: 29.6 PG
MCHC RBC-ENTMCNC: 33.4 G/DL
MCV RBC AUTO: 88.6 FL
MONOCYTES # BLD AUTO: 0.45 K/UL
MONOCYTES NFR BLD AUTO: 4.4 %
NEUTROPHILS # BLD AUTO: 6.29 K/UL
NEUTROPHILS NFR BLD AUTO: 61 %
NITRITE URINE: NEGATIVE
NONHDLC SERPL-MCNC: 96 MG/DL
PH URINE: 5.5
PLATELET # BLD AUTO: 285 K/UL
POTASSIUM SERPL-SCNC: 4.7 MMOL/L
PROT SERPL-MCNC: 6.7 G/DL
PROTEIN URINE: NEGATIVE MG/DL
RBC # BLD: 4.46 M/UL
RBC # FLD: 13.1 %
SODIUM SERPL-SCNC: 135 MMOL/L
SPECIFIC GRAVITY URINE: 1.02
TRIGL SERPL-MCNC: 102 MG/DL
TSH SERPL-ACNC: 2.24 UIU/ML
URATE SERPL-MCNC: 6.3 MG/DL
UROBILINOGEN URINE: 0.2 MG/DL
WBC # FLD AUTO: 10.31 K/UL

## 2025-01-24 PROBLEM — E78.6 LOW HDL (UNDER 40): Status: ACTIVE | Noted: 2025-01-24

## 2025-01-30 ENCOUNTER — APPOINTMENT (OUTPATIENT)
Dept: FAMILY MEDICINE | Facility: CLINIC | Age: 35
End: 2025-01-30
Payer: COMMERCIAL

## 2025-01-30 DIAGNOSIS — T78.02XA ANAPHYLACTIC REACTION DUE TO SHELLFISH (CRUSTACEANS), INITIAL ENCOUNTER: ICD-10-CM

## 2025-01-30 DIAGNOSIS — J30.9 ALLERGIC RHINITIS, UNSPECIFIED: ICD-10-CM

## 2025-01-30 DIAGNOSIS — E78.6 LIPOPROTEIN DEFICIENCY: ICD-10-CM

## 2025-01-30 PROCEDURE — 99213 OFFICE O/P EST LOW 20 MIN: CPT

## 2025-04-29 ENCOUNTER — APPOINTMENT (OUTPATIENT)
Dept: FAMILY MEDICINE | Facility: CLINIC | Age: 35
End: 2025-04-29

## 2025-07-30 ENCOUNTER — APPOINTMENT (OUTPATIENT)
Dept: FAMILY MEDICINE | Facility: CLINIC | Age: 35
End: 2025-07-30